# Patient Record
(demographics unavailable — no encounter records)

---

## 2024-10-10 NOTE — HISTORY OF PRESENT ILLNESS
[postmenopausal] : postmenopausal [N] : Patient is not sexually active [Y] : Positive pregnancy history [Previously active] : previously active [Men] : men [Vaginal] : vaginal [Mammogramdate] : 04/24 [TextBox_19] : OLAYINKA [BoneDensityDate] : 2023 [TextBox_37] : OSTEOPENIA [ColonoscopyDate] : 2021 [TextBox_43] : DUE IN NOVEMBER 2024 [PGHxTotal] : 4 [[facility] MaryvalexFullTerm] : 2 [PGHxAbortions] : 2 [BannerxLiving] : 2 [FreeTextEntry1] : NSVDS

## 2024-10-10 NOTE — PHYSICAL EXAM
[Appropriately responsive] : appropriately responsive [Alert] : alert [No Acute Distress] : no acute distress [Soft] : soft [Non-tender] : non-tender [Non-distended] : non-distended [No HSM] : No HSM [No Lesions] : no lesions [No Mass] : no mass [Oriented x3] : oriented x3 [The Right Breast Was Examined] : a normal appearance [Breast Nipple Inversion Right] : inverted [Left Breast Absent] : a total mastectomy [No Masses] : no breast masses were palpable [Labia Majora] : normal [Labia Minora] : normal [Normal] : normal [Uterine Adnexae] : normal [No Tenderness] : no tenderness [Nl Sphincter Tone] : normal sphincter tone [FreeTextEntry9] : Guaiac neg

## 2024-10-15 NOTE — REVIEW OF SYSTEMS
[Palpitations] : palpitations [Fever] : no fever [Chest Pain] : no chest pain [Shortness Of Breath] : no shortness of breath [Abdominal Pain] : no abdominal pain [Dizziness] : no dizziness [Fainting] : no fainting

## 2024-10-15 NOTE — PHYSICAL EXAM
[No Acute Distress] : no acute distress [No JVD] : no jugular venous distention [No Respiratory Distress] : no respiratory distress  [Clear to Auscultation] : lungs were clear to auscultation bilaterally [Regular Rhythm] : with a regular rhythm [Normal S1, S2] : normal S1 and S2 [No Edema] : there was no peripheral edema [No Focal Deficits] : no focal deficits [Alert and Oriented x3] : oriented to person, place, and time [de-identified] : 1/6 systolic murmur

## 2024-10-15 NOTE — HISTORY OF PRESENT ILLNESS
[de-identified] : 78-year-old female presents for annual wellness visit, follow-up fasting labs and prescription renewal. She has a history of CAD/MI, obesity, hypertension, hyperlipidemia, hypothyroidism, breast cancer/mastectomy, arthritis, sleep apnea/CPAP and more recently paroxysmal atrial fibrillation. Is now on Eliquis 5 mg twice daily.  She has a LILY scheduled for 10/18 with possible cardioversion. Has been on Wegovy through her cardiologist and has lost weight.

## 2024-10-15 NOTE — HEALTH RISK ASSESSMENT
[No] : In the past 12 months have you used drugs other than those required for medical reasons? No [No falls in past year] : Patient reported no falls in the past year [0] : 2) Feeling down, depressed, or hopeless: Not at all (0) [PHQ-2 Negative - No further assessment needed] : PHQ-2 Negative - No further assessment needed [Former] : Former [[id number]] : 0

## 2024-10-15 NOTE — ASSESSMENT
[FreeTextEntry1] : Her exam is unremarkable/unchanged.  CAD/hypertension/paroxysmal atrial fibrillation- Remains on metoprolol ER 50 mg daily, losartan /25 daily, Plavix 75 mg daily and Lasix 20 mg daily.  Also now on Eliquis 5 mg twice daily. Has LILY scheduled for 10/18.  Hyperlipidemia-repeat lipid profile ordered. Continue Vytorin 10/40 daily.  [id number] TSH/T4 ordered. Continue Cytomel 25 mcg daily.  History of breast cancer-she does follow regularly with oncology.

## 2025-04-23 NOTE — REVIEW OF SYSTEMS
[Recent Change In Weight] : ~T recent weight change [Palpitations] : palpitations [Fever] : no fever [Chest Pain] : no chest pain [Shortness Of Breath] : no shortness of breath [Abdominal Pain] : no abdominal pain [Dizziness] : no dizziness [Fainting] : no fainting

## 2025-04-23 NOTE — ASSESSMENT
[FreeTextEntry1] : Her exam is unremarkable/unchanged.  CAD/hypertension/paroxysmal atrial fibrillation-she remains asymptomatic. Will continue metoprolol ER 50 mg daily, Plavix 75 mg daily, Eliquis 5 mg twice daily.  No longer on Lasix or Hyzaar. Continue losartan 100 mg daily and spironolactone 25 mg daily.  Hyperlipidemia-had recent fasting labs/lipid profile. Continue Vytorin 10/40 daily.  Hypothyroidism-recent TSH was normal. Continue Cytomel 25 mcg daily.  History of breast cancer-she does follow regularly with oncology.

## 2025-04-23 NOTE — HISTORY OF PRESENT ILLNESS
[de-identified] : 78-year-old female presents for annual wellness visit, follow-up fasting labs and prescription renewal. She has a history of CAD/MI, obesity, hypertension, hyperlipidemia, hypothyroidism, breast cancer/mastectomy, arthritis, sleep apnea/CPAP and more recently paroxysmal atrial fibrillation. Is now on Eliquis 5 mg twice daily.  Has been on Wegovy through her cardiologist and has l done very well.  Presently on 2.5 mg weekly

## 2025-04-23 NOTE — PHYSICAL EXAM
[No Acute Distress] : no acute distress [No JVD] : no jugular venous distention [Clear to Auscultation] : lungs were clear to auscultation bilaterally [Regular Rhythm] : with a regular rhythm [Normal S1, S2] : normal S1 and S2 [No Edema] : there was no peripheral edema [No Focal Deficits] : no focal deficits [Alert and Oriented x3] : oriented to person, place, and time [de-identified] : 1/6 systolic murmur [de-identified] : Benign

## 2025-04-23 NOTE — HEALTH RISK ASSESSMENT
[No] : No [0] : 2) Feeling down, depressed, or hopeless: Not at all (0) [PHQ-2 Negative - No further assessment needed] : PHQ-2 Negative - No further assessment needed [Former] : Former [[id number]] : 0